# Patient Record
(demographics unavailable — no encounter records)

---

## 2024-10-22 NOTE — REASON FOR VISIT
[FreeTextEntry1] : Admitted to [facility] 3/29/17, dx with a Cardiomyopathy.  EF 44%, apical infarct, diagnosis  was either Takotsubo CM vs an LAD infarct. EF has normalized, suspect T CM. CRFs include HTN only, never smoked, not diabetic or HLD  Normal Sinus Rhythm with an IWMI, PRWP,  nonspecific ST-T wave abnormalities 7/16/18  Admitted with viral PNA Nov 2017 Admitted to [facility] [place] with right arm cellulitis Nov 2018 7/16/19 Much more SOB/ROMEO. Has known CAD, unstented LAD of 60-70%, moderate AI and moderate to severe MR.  Will need to reevaluate 1/10/20- s/p cath, minimal CAD, MR down to mild after diureses.  6/12/20  Continues to drop weight.  HIGHLY MOTIVATED, eating well, doing exercise 2/8/21 Pfizer shots complete, lost 8 lbs, exercising 8/9/21 Doing well.  Lost a few pounds. 2/7/22  Had Covid Sept 2021 despite booster.  Saw Dr Jones, was anemic.  saw Dr Wilkes.  Off Aspirin. Exercises. Doing well. 8/8/22 Recent Bronchitis, had Rota virus with diarrhea in [facility] x 3 days.  Also had Covid April 2022. Had a low iron with Dr Cloud. Last colonoscopy 10 y/a.He may be retiring and she wants me to be her primary MD. 2/8/23  Having neck pain, to see Dr Herndon 6/9/23 Doing well, helping Melly Gokul so very tired.  Had Covid March 2023 10/9/23   Had diarrhea x 1 mo, saw Dr Hernandez and given Xifaxin, treated for Rosea as well.  2-3 weeks ago felt a twinge in her right leg, saw Dr Bermeo.  He concluded it might be a vein problem. Also saw Dr Goode.  Told nothing wrong.  It feels like a resolving hematoma, no ecchymosis though.  10/17/23  Saw Dr Mark Roque who noted a cellulitis RLE, pt describes drainage.  We agreed to start  Levaquin. She took only 1 dose starting today.  10/26/23  US done last week showed a complex fluid collection c/w abscess.  Here for f/u.  Pt says it  is doing much better.  s/p with fx femur with good. 3/18/24  s/p with fx femur  Nov 2023, with good response to surgery (timoteo was placed) and 3 mos of rehab.  Had RSV virus in rehab.  Had flu and Covid shots.   To see Dr Banegas.   Pt says her wt is 154 lbs.  Had severe N/V on Levaquin + an anti-fungal. Had thrush.  Pt found to have 2 hernias.  6/27/24 TELEMED:  Pt is in rehab, s/p fall 1 mo ago.  Multiple injuries. All hardware changed, surgery on both legs.  BP is low.100/60 7/19/24 plan for wound closure on 7/29/24 with Dr Mcguire. taking torsemide 10mg 5 days/week. post op gained up to 20lbx. MD in her rehab facility has been titrating torsemide since.  9/12/24 recovering from extensive surgeries on L leg. does have some mild discoloration on LLE -states she had an u/s on LLL before her d/c that was negative for blood clot. has had extreme diarrhea for the past 2 weeks. has had accidents. felt this way previously when she has had antibiotic. stopped the abx 3 weeks ago. no changes in diet. 3x/day diarrhea. 4 weeks ago had negative stool studies. has started the osteoporosis injection forteo about 1 mo ago.  10/22/24 Continues to do well

## 2024-10-22 NOTE — PHYSICAL EXAM
[Normal S1, S2] : normal S1, S2 [No Gallop] : no gallop [Normal] : alert and oriented, normal memory [de-identified] : cataract OD, lense OS [de-identified] : KACY [de-identified] : DJD of fingers [de-identified] : chronic stasis changes  More c/w infection/abscess. Erythema.  Erythema nearly resolved, fluid collection much smaller

## 2024-10-22 NOTE — ASSESSMENT
[FreeTextEntry1] : diarrhea -will get stool studies done. discussed BRAT diet. will stop olmesartan d/t gluten intolerance like reaction to med that has been reported. BP controlled today -was low normal per report in the rehab. will follow up in 1 mo to reintroduce/change med pending rxn.   pre op -optimized for wound closure procedure.  Revised cardiac risk index: 2. completing rehab without sxs for 2hrs/day. recent echo stable. labs from rehab look good. pt/inr done today. 9/12/24 -recovering well   Cardiomyopathy- Takotsubo CM VS an LAD infarct in [facility] 3/29/17, EF 44%. Cardiac cath with Dr Victoria had a 60-70% LAD lesion with a NL FFR so no PCI. Echo 7/19/17, reveals resolution of apical wall motion abnormality making Takutsubo more likely. EF now NL. Highly motivated to do everything right. 7/22/24 cont torsemide titration with rehab 9/12/24 torsemide TIW   Cellulitis VS Abscess- RLE, New US ordered.  Continue Levaquin. Labs were drawn  in Office.  US c/w abscess.  f/u 10/26/23.  Looking much better but not fully resolved, will continue Antx, pt to call in 2 wks  ROMEO/SOB- much worse- check BNP and update Nuc and echo. Has untreated LAD dz, AI and MR. Cath, 30% LAD, MR was minimal with diuresis. No s/s of HF  HTN- controlled.  Low BP in rehab. Off Benicar, torsemide PRN.  Tramadol is being used sparingly.  No need for mineralocorticoid.  Will add Bencar 5mg on next visit if BP high.  Edema- Due to VI, occasional prednisone and Mobic. She does not wear support hose but does elevate her feet. Much better with diuresis. Torsemide is once daily and not using K. Reduced to 10 mg. S/P fall in apt on carpet June 2024. In rehab, alot of edema which is likely hematoma.  ASHD- 60-70% mLAD but with a normal FFR, so it was left alone. Continue ASA and Atorvastatin. Not on BB due to asthma. Told to minimize Mobic. Meds renewed. Repeat cath, LAD only 30%. ASA reduced to 1 325 mg tab daily. Off 81 mg for more bruising.  HLD- continue Lipitor, renewed  Thy Dx- TFTs drawn, not sure she needs both meds.  Breast CA- s/p right mastectomy, chemo, RT and LN dissection. Colonoscopy discussed, she defers. Mammogram done 2023.  Health Maintainence- Last booster Aug 2022.  Osteoarthritis- on multiple drugs that interact, ie ASA and Meloxicam(increased risk of PUD), Lipitor and Colcrys(increased risk of myositis).  2 tabs daily, no Mobic. Can try Lidoderm patches, tylenol and heating Pad. Seeing Dr Bk Hines with a good response to Colchicine and HCQ. To see Dr Herndon for neck pain.

## 2024-10-22 NOTE — REVIEW OF SYSTEMS
[Negative] : Heme/Lymph [FreeTextEntry9] : trace edema, chronic discolortion [de-identified] : resolving cellulitis and abscess LLE.  Much improved.

## 2024-10-22 NOTE — OBJECTIVE
[History reviewed] : History reviewed. [Medications and Allergies reviewed] : Medications and allergies reviewed. 23-Aug-2018 09:33

## 2024-10-22 NOTE — HISTORY OF PRESENT ILLNESS
[FreeTextEntry1] : PMD- Dr Mark Brumfield- Dr Banegas Rheum- Dr Bk Hines Derm- Dr Jett Chand ID- Dr Andrzej Dr @ [facility] [place] [facility]- Dr Yon eGrard Breast- Sherri Olvera @ [facility] [place] Ortho- Dr Anthony Mcguire

## 2024-11-19 NOTE — PHYSICAL EXAM
[de-identified] : Gait: Presents with use of a cane in R hand. Demonstrates ongoing caution but no antalgia.  Left knee: - Focal soft tissue swelling: some ongoing subcutaneous swelling affecting the lower leg and ankle w no active cellulitis. - Ecchymosis: none - Erythema: none - Effusion: none - Wounds: well healed anterior knee incision, lateral hip and thigh incisions; benign appearing - Alignment: normal - Tenderness: none - ROM: 0-95 - Collateral laxity: none; stable - Cruciate laxity: none; stable - Popliteal angle (degrees): deferred - Quad strength: 5/5 - Extensor lag: none. [de-identified] : Left knee and femur x-rays were repeated, interpreted by me, and reviewed with the patient.   Location of imaging: [facility] Date of exam: 11/15/2024  - These demonstrate stable appearance of her left distal femoral replacement, her left hip gamma nail, and her left femur prophylactic plate. As compared to prior imaging, there is no evidence of any progressive mechanical deterioration as compared to prior imaging. There is some progression of posterior medial heterotopic ossification at the bone implant interface of the DFR.

## 2024-11-19 NOTE — ADDENDUM
[FreeTextEntry1] :   Documented by Zaire Castillo acting as a scribe for Dr. Anthony Mcguire. 11/15/2024

## 2024-11-19 NOTE — HISTORY OF PRESENT ILLNESS
[de-identified] : L knee I&D, revision wound closure 7/29/24 L femur exchange cephalomedullary nailing, left distal femoral replacement, left femur prophylactic plating 5/19/24  11/15/24: 84 year old female following up now about three and a half months status post left knee irrigation and debridement as well as now about six months status-post left hip exchange nailing, distal femoral replacement, and prophylactic plating of left femur. She reports that she's doing well. She has no pain. She's been continuing with personal training sessions and notes improved mobility. She's been compliant with our instructions regarding use of cane versus other assistive devices. She reports that she is now able to sleep on her left side and she has no other new complaints today.

## 2024-11-19 NOTE — HISTORY OF PRESENT ILLNESS
[de-identified] : L knee I&D, revision wound closure 7/29/24 L femur exchange cephalomedullary nailing, left distal femoral replacement, left femur prophylactic plating 5/19/24  11/15/24: 84 year old female following up now about three and a half months status post left knee irrigation and debridement as well as now about six months status-post left hip exchange nailing, distal femoral replacement, and prophylactic plating of left femur. She reports that she's doing well. She has no pain. She's been continuing with personal training sessions and notes improved mobility. She's been compliant with our instructions regarding use of cane versus other assistive devices. She reports that she is now able to sleep on her left side and she has no other new complaints today.

## 2024-11-19 NOTE — PHYSICAL EXAM
[de-identified] : Gait: Presents with use of a cane in R hand. Demonstrates ongoing caution but no antalgia.  Left knee: - Focal soft tissue swelling: some ongoing subcutaneous swelling affecting the lower leg and ankle w no active cellulitis. - Ecchymosis: none - Erythema: none - Effusion: none - Wounds: well healed anterior knee incision, lateral hip and thigh incisions; benign appearing - Alignment: normal - Tenderness: none - ROM: 0-95 - Collateral laxity: none; stable - Cruciate laxity: none; stable - Popliteal angle (degrees): deferred - Quad strength: 5/5 - Extensor lag: none. [de-identified] : Left knee and femur x-rays were repeated, interpreted by me, and reviewed with the patient.   Location of imaging: [facility] Date of exam: 11/15/2024  - These demonstrate stable appearance of her left distal femoral replacement, her left hip gamma nail, and her left femur prophylactic plate. As compared to prior imaging, there is no evidence of any progressive mechanical deterioration as compared to prior imaging. There is some progression of posterior medial heterotopic ossification at the bone implant interface of the DFR.

## 2024-11-19 NOTE — DISCUSSION/SUMMARY
[de-identified] : 84-year-old female, about six months status post complex left femoral reconstruction and three and a half months status post irrigation and debridement as well as revision primary wound closure of proximal wound dehiscence. Overall doing very well at this time. - I encouraged her to continue with her personal training sessions and other aspects of her home exercise program. - We reiterated again that I would encourage her to use an assistive device for life, specifically for fall prevention. - At this point she's been very stable clinically and radiographically. I'd like to continue seeing her back annually with repeat left femur and knee x-rays earlier as needed for any new or worsening symptoms.

## 2024-11-19 NOTE — END OF VISIT
[FreeTextEntry3] :   Documented by Zaire Castillo acting as a scribe for Dr. Anthony Mcguire. 11/15/2024   All medical record entries made by the Zaire Castillo (Scribe) were at my, Dr. Anthony Mcguire, direction and personally dictated by me on 11/15/2024. I have reviewed the chart and agree that the record accurately reflects my personal performance of the history, physical exam, assessment and plan. I have also personally directed, reviewed, and agreed with the chart.

## 2024-11-19 NOTE — DISCUSSION/SUMMARY
[de-identified] : 84-year-old female, about six months status post complex left femoral reconstruction and three and a half months status post irrigation and debridement as well as revision primary wound closure of proximal wound dehiscence. Overall doing very well at this time. - I encouraged her to continue with her personal training sessions and other aspects of her home exercise program. - We reiterated again that I would encourage her to use an assistive device for life, specifically for fall prevention. - At this point she's been very stable clinically and radiographically. I'd like to continue seeing her back annually with repeat left femur and knee x-rays earlier as needed for any new or worsening symptoms.

## 2025-01-23 NOTE — PHYSICAL EXAM
[Normal S1, S2] : normal S1, S2 [No Gallop] : no gallop [Normal] : alert and oriented, normal memory [de-identified] : cataract OD, lense OS [de-identified] : KACY [de-identified] : DJD of fingers [de-identified] : chronic stasis changes  More c/w infection/abscess. Erythema.  Erythema nearly resolved, fluid collection much smaller

## 2025-01-23 NOTE — REASON FOR VISIT
[FreeTextEntry1] : Admitted to [facility] 3/29/17, dx with a Cardiomyopathy.  EF 44%, apical infarct, diagnosis  was either Takotsubo CM vs an LAD infarct. EF has normalized, suspect T CM. CRFs include HTN only, never smoked, not diabetic or HLD  Normal Sinus Rhythm with an IWMI, PRWP,  nonspecific ST-T wave abnormalities 7/16/18  Admitted with viral PNA Nov 2017 Admitted to [facility] with right arm cellulitis Nov 2018 7/16/19 Much more SOB/ROMEO. Has known CAD, unstented LAD of 60-70%, moderate AI and moderate to severe MR.  Will need to reevaluate 1/10/20- s/p cath, minimal CAD, MR down to mild after diureses.  6/12/20  Continues to drop weight.  HIGHLY MOTIVATED, eating well, doing exercise 2/8/21 Pfizer shots complete, lost 8 lbs, exercising 8/9/21 Doing well.  Lost a few pounds. 2/7/22  Had Covid Sept 2021 despite booster.  Saw Dr Jones, was anemic.  saw Dr Wilkes.  Off Aspirin. Exercises. Doing well. 8/8/22 Recent Bronchitis, had Rota virus with diarrhea in [facility] x 3 days.  Also had Covid April 2022. Had a low iron with Dr Colud. Last colonoscopy 10 y/a.He may be retiring and she wants me to be her primary MD. 2/8/23  Having neck pain, to see Dr Herndon 6/9/23 Doing well, helping Melly Gokul so very tired.  Had Covid March 2023 10/9/23   Had diarrhea x 1 mo, saw Dr Hernandez and given Xifaxin, treated for Rosea as well.  2-3 weeks ago felt a twinge in her right leg, saw Dr Bermeo.  He concluded it might be a vein problem. Also saw Dr Goode.  Told nothing wrong.  It feels like a resolving hematoma, no ecchymosis though.  10/17/23  Saw Dr Mark Roque who noted a cellulitis RLE, pt describes drainage.  We agreed to start  Levaquin. She took only 1 dose starting today.  10/26/23  US done last week showed a complex fluid collection c/w abscess.  Here for f/u.  Pt says it  is doing much better.  s/p with fx femur with good. 3/18/24  s/p with fx femur  Nov 2023, with good response to surgery (timoteo was placed) and 3 mos of rehab.  Had RSV virus in rehab.  Had flu and Covid shots.   To see Dr Banegas.   Pt says her wt is 154 lbs.  Had severe N/V on Levaquin + an anti-fungal. Had thrush.  Pt found to have 2 hernias.  6/27/24 TELEMED:  Pt is in rehab, s/p fall 1 mo ago.  Multiple injuries. All hardware changed, surgery on both legs.  BP is low.100/60 7/19/24 plan for wound closure on 7/29/24 with Dr Mcguire. taking torsemide 10mg 5 days/week. post op gained up to 20lbx. MD in her rehab facility has been titrating torsemide since.  9/12/24 recovering from extensive surgeries on L leg. does have some mild discoloration on LLE -states she had an u/s on LLL before her d/c that was negative for blood clot. has had extreme diarrhea for the past 2 weeks. has had accidents. felt this way previously when she has had antibiotic. stopped the abx 3 weeks ago. no changes in diet. 3x/day diarrhea. 4 weeks ago had negative stool studies. has started the osteoporosis injection forteo about 1 mo ago.  10/22/24 Continues to do well 1/23/25  In [facility] 1/1 to 1/10/25 with an incarcerated ventral hernia, s/p surgery.  Feels well. Seeing Dr Beckman.  Marked LE edema. Wt up 10 lbs.

## 2025-01-23 NOTE — REVIEW OF SYSTEMS
[Negative] : Heme/Lymph [FreeTextEntry9] : trace edema, chronic discolortion [de-identified] : resolving cellulitis and abscess LLE.  Much improved.

## 2025-01-23 NOTE — ASSESSMENT
[FreeTextEntry1] : diarrhea -will get stool studies done. discussed BRAT diet. will stop olmesartan d/t gluten intolerance like reaction to med that has been reported. BP controlled today -was low normal per report in the rehab. will follow up in 1 mo to reintroduce/change med pending rxn.   Post Op- recovering well from hernia  pre op -optimized for wound closure procedure.  Revised cardiac risk index: 2. completing rehab without sxs for 2hrs/day. recent echo stable. labs from rehab look good. pt/inr done today. 9/12/24 -recovering well   Cardiomyopathy- Takotsubo CM VS an LAD infarct in [facility] 3/29/17, EF 44%. Cardiac cath with Dr Victoria had a 60-70% LAD lesion with a NL FFR so no PCI. Echo 7/19/17, reveals resolution of apical wall motion abnormality making Takutsubo more likely. EF now NL. Highly motivated to do everything right. 7/22/24 cont torsemide titration with rehab 9/12/24 torsemide TIW   Cellulitis VS Abscess- RLE, New US ordered.  Continue Levaquin. Labs were drawn  in Office.  US c/w abscess.  f/u 10/26/23.  Looking much better but not fully resolved, will continue Antx, pt to call in 2 wks  ROMEO/SOB- much worse- check BNP and update Nuc and echo. Has untreated LAD dz, AI and MR. Cath, 30% LAD, MR was minimal with diuresis. No s/s of HF  HTN- controlled.  Low BP in rehab. Off Benicar, torsemide PRN.  Tramadol is being used sparingly.  No need for mineralocorticoid.  Will add Bencar 5mg on next visit if BP high.  Edema- Due to VI, occasional prednisone and Mobic. She does not wear support hose but does elevate her feet. Much better with diuresis. Torsemide is once daily and not using K. Reduced to 10 mg. S/P fall in apt on carpet June 2024. In rehab, alot of edema which is likely hematoma.  ASHD- 60-70% mLAD but with a normal FFR, so it was left alone. Continue ASA and Atorvastatin. Not on BB due to asthma. Told to minimize Mobic. Meds renewed. Repeat cath, LAD only 30%. ASA reduced to 1 325 mg tab daily. Off 81 mg for more bruising.  HLD- continue Lipitor, renewed  Thy Dx- TFTs drawn, not sure she needs both meds.  Breast CA- s/p right mastectomy, chemo, RT and LN dissection. Colonoscopy discussed, she defers. Mammogram done 2023.  Health Maintainence- Last booster Aug 2022.  Osteoarthritis- on multiple drugs that interact, ie ASA and Meloxicam(increased risk of PUD), Lipitor and Colcrys(increased risk of myositis).  2 tabs daily, no Mobic. Can try Lidoderm patches, tylenol and heating Pad. Seeing Dr Bk Hines with a good response to Colchicine and HCQ. To see Dr Herndon for neck pain.

## 2025-02-10 NOTE — HISTORY OF PRESENT ILLNESS
[TextBox_4] : leg surgery knee surgery obsruction surgery abdominal hernia aortic stenosis  asthma cardiac disease systolic murmur severe murmur-  but eva thinks its on moderate

## 2025-05-08 NOTE — HISTORY OF PRESENT ILLNESS
[FreeTextEntry1] : PMD- Dr Mark Brumfield- Dr Banegas Rheum- Dr Bk Hines Derm- Dr Jett Chand ID- Dr Andrzej Dr @ [facility] [place] [facility]- Dr Yon Gerard Breast- Sherri Olvera @ [facility]- Dr Anthony Mcguire GS- Dr Beckman

## 2025-05-08 NOTE — ASSESSMENT
[FreeTextEntry1] : diarrhea -will get stool studies done. discussed BRAT diet. will stop olmesartan d/t gluten intolerance like reaction to med that has been reported. BP controlled today -was low normal per report in the rehab. will follow up in 1 mo to reintroduce/change med pending rxn.   Post Op- recovering well from hernia  Traveling to [place]- ASA 81 mf BID for 3 days around time of travel.  pre op -optimized for wound closure procedure.  Revised cardiac risk index: 2. completing rehab without sxs for 2hrs/day. recent echo stable. labs from rehab look good. pt/inr done today. 9/12/24 -recovering well   Cardiomyopathy- Takotsubo CM VS an LAD infarct in [facility] 3/29/17, EF 44%. Cardiac cath with Dr Victoria had a 60-70% LAD lesion with a NL FFR so no PCI. Echo 7/19/17, reveals resolution of apical wall motion abnormality making Takutsubo more likely. EF now NL. Highly motivated to do everything right. 7/22/24 cont torsemide titration with rehab 9/12/24 torsemide TIW   Cellulitis VS Abscess- RLE, New US ordered.  Continue Levaquin. Labs were drawn  in Office.  US c/w abscess.  f/u 10/26/23.  Looking much better but not fully resolved, will continue Antx, pt to call in 2 wks  ROMEO/SOB- much worse- check BNP and update Nuc and echo. Has untreated LAD dz, AI and MR. Cath, 30% LAD, MR was minimal with diuresis. No s/s of HF  HTN- controlled.  Low BP in rehab. Off Benicar, torsemide PRN.  Tramadol is being used sparingly.  No need for mineralocorticoid.  Will add Bencar 5mg on next visit if BP high.  Edema- Due to VI, occasional prednisone and Mobic. She does not wear support hose but does elevate her feet. Much better with diuresis. Torsemide is once daily and not using K. Reduced to 10 mg. S/P fall in apt on carpet June 2024. In rehab, alot of edema which is likely hematoma.  ASHD- 60-70% mLAD but with a normal FFR, so it was left alone. Continue ASA and Atorvastatin. Not on BB due to asthma. Told to minimize Mobic. Meds renewed. Repeat cath, LAD only 30%. ASA reduced to 1 325 mg tab daily. Off 81 mg for more bruising.  HLD- continue Lipitor, renewed  Thy Dx- TFTs drawn, not sure she needs both meds.  Breast CA- s/p right mastectomy, chemo, RT and LN dissection. Colonoscopy discussed, she defers. Mammogram done 2023.  Health Maintainence- Last booster Aug 2022.  Osteoarthritis- on multiple drugs that interact, ie ASA and Meloxicam(increased risk of PUD), Lipitor and Colcrys(increased risk of myositis).  2 tabs daily, no Mobic. Can try Lidoderm patches, tylenol and heating Pad. Seeing Dr Bk Hines with a good response to Colchicine and HCQ. To see Dr Herndon for neck pain.

## 2025-05-08 NOTE — PHYSICAL EXAM
[Normal S1, S2] : normal S1, S2 [No Gallop] : no gallop [Normal] : alert and oriented, normal memory [de-identified] : cataract OD, lense OS [de-identified] : KACY [de-identified] : DJD of fingers [de-identified] : chronic stasis changes  More c/w infection/abscess. Erythema.  Erythema nearly resolved, fluid collection much smaller

## 2025-05-08 NOTE — REVIEW OF SYSTEMS
[Negative] : Heme/Lymph [FreeTextEntry9] : trace edema, chronic discolortion [de-identified] : resolving cellulitis and abscess LLE.  Much improved.

## 2025-05-08 NOTE — REASON FOR VISIT
[FreeTextEntry1] : Admitted to [facility] 3/29/17, dx with a Cardiomyopathy.  EF 44%, apical infarct, diagnosis  was either Takotsubo CM vs an LAD infarct. EF has normalized, suspect T CM. CRFs include HTN only, never smoked, not diabetic or HLD  Normal Sinus Rhythm with an IWMI, PRWP,  nonspecific ST-T wave abnormalities 7/16/18  Admitted with viral PNA Nov 2017 [place] [facility] [place] Nov 2018 7/16/19 Much more SOB/ROMEO. Has known CAD, unstented LAD of 60-70%, moderate AI and moderate to severe MR.  Will need to reevaluate 1/10/20- s/p cath, minimal CAD, MR down to mild after diureses.  6/12/20  Continues to drop weight.  HIGHLY MOTIVATED, eating well, doing exercise 2/8/21 Pfizer shots complete, lost 8 lbs, exercising 8/9/21 Doing well.  Lost a few pounds. 2/7/22  Had Covid Sept 2021 despite booster.  Saw Dr Jones, was anemic.  saw Dr Wilkes.  Off Aspirin. Exercises. Doing well. 8/8/22 Recent Bronchitis, had Rota virus with diarrhea in [facility] x 3 days.  Also had Covid April 2022. Had a low iron with Dr Cloud. Last colonoscopy 10 y/a.He may be retiring and she wants me to be her primary MD. 2/8/23  Having neck pain, to see Dr Herndon 6/9/23 Doing well, helping Melly Gokul so very tired.  Had Covid March 2023 10/9/23   Had diarrhea x 1 mo, saw Dr Hernandez and given Xifaxin, treated for Rosea as well.  2-3 weeks ago felt a twinge in her right leg, saw Dr Bermeo.  He concluded it might be a vein problem. Also saw Dr Goode.  Told nothing wrong.  It feels like a resolving hematoma, no ecchymosis though.  10/17/23  Saw Dr Mark Roque who noted a cellulitis RLE, pt describes drainage.  We agreed to start  Levaquin. She took only 1 dose starting today.  10/26/23  US done last week showed a complex fluid collection c/w abscess.  Here for f/u.  Pt says it  is doing much better.  s/p with fx femur with good. 3/18/24  s/p with fx femur  Nov 2023, with good response to surgery (timoteo was placed) and 3 mos of rehab.  Had RSV virus in rehab.  Had flu and Covid shots.   To see Dr Banegas.   Pt says her wt is 154 lbs.  Had severe N/V on Levaquin + an anti-fungal. Had thrush.  Pt found to have 2 hernias.  6/27/24 TELEMED:  Pt is in rehab, s/p fall 1 mo ago.  Multiple injuries. All hardware changed, surgery on both legs.  BP is low.100/60 7/19/24 plan for wound closure on 7/29/24 with Dr Mcguier. taking torsemide 10mg 5 days/week. post op gained up to 20lbx. MD in her rehab facility has been titrating torsemide since.  9/12/24 recovering from extensive surgeries on L leg. does have some mild discoloration on LLE -states she had an u/s on LLL before her d/c that was negative for blood clot. has had extreme diarrhea for the past 2 weeks. has had accidents. felt this way previously when she has had antibiotic. stopped the abx 3 weeks ago. no changes in diet. 3x/day diarrhea. 4 weeks ago had negative stool studies. has started the osteoporosis injection forteo about 1 mo ago.  10/22/24 Continues to do well 1/23/25  In [facility] 1/1 to 1/10/25 with an incarcerated ventral hernia, s/p surgery.  Feels well. Seeing Dr Beckman.  Marked LE edema. Wt up 10 lbs. 5/8/25 s/p hernia surgery Jan 2025. Leaving on a plane tomorrow for [place] to be followed by a 15 day cruise and then 6 days in [place].